# Patient Record
Sex: FEMALE | Race: WHITE | Employment: FULL TIME | URBAN - METROPOLITAN AREA
[De-identification: names, ages, dates, MRNs, and addresses within clinical notes are randomized per-mention and may not be internally consistent; named-entity substitution may affect disease eponyms.]

---

## 2019-08-26 ENCOUNTER — HOSPITAL ENCOUNTER (OUTPATIENT)
Dept: GENERAL RADIOLOGY | Age: 59
Discharge: HOME | End: 2019-08-26
Payer: COMMERCIAL

## 2019-08-26 DIAGNOSIS — R52 PAIN: ICD-10-CM

## 2019-08-26 PROCEDURE — 74018 RADEX ABDOMEN 1 VIEW: CPT

## 2021-01-11 ENCOUNTER — OFFICE VISIT (OUTPATIENT)
Dept: NEUROLOGY | Age: 61
End: 2021-01-11
Payer: COMMERCIAL

## 2021-01-11 VITALS
WEIGHT: 208 LBS | SYSTOLIC BLOOD PRESSURE: 138 MMHG | BODY MASS INDEX: 34.66 KG/M2 | OXYGEN SATURATION: 97 % | HEART RATE: 88 BPM | HEIGHT: 65 IN | DIASTOLIC BLOOD PRESSURE: 84 MMHG

## 2021-01-11 DIAGNOSIS — R25.3 FASCICULATIONS: ICD-10-CM

## 2021-01-11 DIAGNOSIS — R53.1 WEAKNESS GENERALIZED: Primary | ICD-10-CM

## 2021-01-11 DIAGNOSIS — R13.19 OTHER DYSPHAGIA: ICD-10-CM

## 2021-01-11 PROCEDURE — 99244 OFF/OP CNSLTJ NEW/EST MOD 40: CPT | Performed by: PSYCHIATRY & NEUROLOGY

## 2021-01-11 RX ORDER — LORAZEPAM 1 MG/1
TABLET ORAL
Qty: 4 TAB | Refills: 0 | Status: SHIPPED | OUTPATIENT
Start: 2021-01-11

## 2021-01-11 NOTE — LETTER
1/11/2021 Patient: Latesha So YOB: 1960 Date of Visit: 1/11/2021 Leni Serrano MD 
[address] [place] [address] [address] [id number] Via Fax: [phone] Dear Leni Serrano MD, Thank you for referring Ms. Jean Matson to [address] [place] [address] for evaluation. My notes for this consultation are attached. If you have questions, please do not hesitate to call me. I look forward to following your patient along with you. Sincerely, [address] [facility], DO 
 
1/11/2021 Patient:  Latesha So YOB: 1960 Date of Visit: 1/11/2021 Dear Leni Serrano MD 
[address] [place] [address] 805 1890 3827 [facility] 7 [phone] Via Fax: [phone]: I was requested by Pricilla Jimenez MD to evaluate Ms. Jean Matson  for Chief Complaint Patient presents with  Neurologic Problem Muscle twitching \"that started in both my legs over a year ago and since then has moved up to my arms and lower back and it seems to come with alot of overall weakness. \" Roosevelt Kim I am recommending the following:  
 
Diagnoses and all orders for this visit: 
 
1. Weakness generalized -     ACETYLCHOLINE RECEPTOR PANEL; Future -     MRI BRAIN WO CONT; Future -     MRI CERV SPINE WO CONT; Future -     LORazepam (ATIVAN) 1 mg tablet; 1 tab 30-40 min before MRI, may repeat once, no driving. 
-     EMG NCV MOTOR WITH F/WAVE PER NERVE; Future 2. Fasciculations -     ACETYLCHOLINE RECEPTOR PANEL; Future -     MRI BRAIN WO CONT; Future -     MRI CERV SPINE WO CONT; Future -     LORazepam (ATIVAN) 1 mg tablet; 1 tab 30-40 min before MRI, may repeat once, no driving. 
-     EMG NCV MOTOR WITH F/WAVE PER NERVE; Future 3. Other dysphagia -     ACETYLCHOLINE RECEPTOR PANEL; Future -     MRI BRAIN WO CONT; Future -     MRI CERV SPINE WO CONT; Future -     LORazepam (ATIVAN) 1 mg tablet; 1 tab 30-40 min before MRI, may repeat once, no driving. 
-     EMG NCV MOTOR WITH F/WAVE PER NERVE; Future 
 
 
 
---------------------------------------------------------------------------------------------------------------------- Below is my encounter: Chief Complaint Patient presents with  Neurologic Problem Muscle twitching \"that started in both my legs over a year ago and since then has moved up to my arms and lower back and it seems to come with alot of overall weakness. \"  
 
 
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 She tells me for about a year maybe longer she is noticed some twitching that began in the left thigh and now has become more generalized in both thighs sometimes her arms. No facial or torso involvement. She feels like she has some weakness by the end of the day where it is hard to ambulate. No double vision. No slurred speech. She does think in the morning she is a little bit of dysphagia but that is only temporary and she thinks is due to reflux and recent stricture that has been stretched in the past.  No falls. No unusual headaches. Family history remarkable for Parkinson's and MS. 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  Increasing anxiety. Review of Systems Constitutional: Positive for malaise/fatigue. Eyes: Negative for double vision. Respiratory: Negative for shortness of breath. Neurological: Positive for weakness. Twitching Psychiatric/Behavioral: The patient is nervous/anxious. All other systems reviewed and are negative. Past Medical History:  
Diagnosis Date  Arrhythmia   
 hx of svt  Asthma   
 as child  GERD (gastroesophageal reflux disease)  Hypertension  Other ill-defined conditions(799.89) seasonal allergies  Other ill-defined conditions(799.89)   
 dyslipidemia Family History Problem Relation Age of Onset  Arthritis-rheumatoid Mother  Elevated Lipids Mother  Heart Disease Father  Parkinsonism Father  Hypertension Father  Heart Failure Father  MS Sister Social History Socioeconomic History  Marital status:  Spouse name: Not on file  Number of children: Not on file  Years of education: Not on file  Highest education level: Not on file Occupational History  Not on file Social Needs  Financial resource strain: Not on file  Food insecurity Worry: Not on file Inability: Not on file  Transportation needs Medical: Not on file Non-medical: Not on file Tobacco Use  Smoking status: Never Smoker  Smokeless tobacco: Never Used Substance and Sexual Activity  Alcohol use: Yes Alcohol/week: 0.0 standard drinks Comment: occ. etoh  Drug use: Yes Types: OTC  Sexual activity: Not on file Lifestyle  Physical activity Days per week: Not on file Minutes per session: Not on file  Stress: Not on file Relationships  Social connections Talks on phone: Not on file Gets together: Not on file Attends Mandaeism service: Not on file Active member of club or organization: Not on file Attends meetings of clubs or organizations: Not on file Relationship status: Not on file  Intimate partner violence Fear of current or ex partner: Not on file Emotionally abused: Not on file Physically abused: Not on file Forced sexual activity: Not on file Other Topics Concern  Not on file Social History Narrative  Not on file Current Outpatient Medications Medication Sig  LORazepam (ATIVAN) 1 mg tablet 1 tab 30-40 min before MRI, may repeat once, no driving.  losartan-hydrochlorothiazide (HYZAAR) 100-25 mg per tablet Take 1 Tab by mouth daily.  albuterol (PROVENTIL HFA) 90 mcg/actuation inhaler Take  by inhalation.  hydrocortisone (ANUSOL-HC) 25 mg supp Insert 1 Suppository into rectum every twelve (12) hours as needed. Indications: HEMORRHOIDS  witch hazel-glycerin (TUCKS, WITCH HAZEL,) 50 % padm Use 1-2 wipes for cleansing after bowel movements and as needed for hemorrhoids Indications: HEMORRHOIDS  cetirizine (ZYRTEC) 10 mg tablet Take 10 mg by mouth as needed. No current facility-administered medications for this visit. Allergies Allergen Reactions  Shellfish Containing Products Shortness of Breath and Nausea and Vomiting Neurologic Exam  
 
Mental Status Oriented to person, place, and time. Cranial Nerves Cranial nerves II through XII intact. Motor Exam  
Muscle bulk: normal 
 
Strength Strength 5/5 throughout. No fasciculations Sensory Exam  
Light touch normal.  
 
Gait, Coordination, and Reflexes Gait Gait: normal 
 
Coordination Finger to nose coordination: normal 
 
Tremor Resting tremor: absentSymmetric brisk reflexes throughout 3/4 Normal tone No abnormal movements Physical Exam  
Constitutional: She is oriented to person, place, and time. She appears well-developed and well-nourished. Cardiovascular: Normal rate. Pulmonary/Chest: Effort normal.  
Neurological: She is oriented to person, place, and time. She has normal strength. She has a normal Finger-Nose-Finger Test. Gait normal.  
Skin: Skin is warm and dry. Psychiatric: Her behavior is normal.  
Vitals reviewed. Visit Vitals /84 (BP 1 Location: Left arm, BP Patient Position: Sitting) Pulse 88 Ht 5' 5\" (1.651 m) Wt 208 lb (94.3 kg) SpO2 97% BMI 34.61 kg/m² No blood work to review. CT Results (maximum last 3): Results from [facility] Encounter encounter on 12/17/10 CT PELVIS WITH CONTRAST Narrative Final Report ICD Codes / Adm. Diagnosis: 789.03   / ACUTE RIGHT LOWER QUADRANT HILDA Examination:  Gloriakarinaanastasia Buchanan  - [id number] - Dec 17 2010  1:12PM 
 Accession No:  [id number] Reason:  abd pain REPORT: 
INDICATION: Right lower quadrant pain. COMPARISON: None. TECHNIQUE:  
Multislice helical CT was performed from the diaphragm to the symphysis  
pubis during uneventful rapid bolus intravenous administration of 100 mL of  
Optiray 350. Oral contrast was also administered. Delayed images of the  
abdomen were acquired. Contiguous 5 mm axial images were reconstructed and  
lung and soft tissue windows were generated. Coronal reformations were  
generated. FINDINGS: 
The visualized portions of the lung bases are clear. There are no focal abnormalities within the liver, spleen, pancreas, adrenal  
glands or kidneys. Right upper quadrant clips are present. The aorta tapers without aneurysm. There is no retroperitoneal adenopathy  
or mass. The bowel is normal.  The appendix is not visualized. . There is no ascites  
or free intraperitoneal air. The uterus has been resected   There is no pelvic mass or adenopathy. The delayed images demonstrate good bilateral excretion of contrast into the  
renal collecting systems. Multiple sclerotic 3 to 4 mm foci are present in the L1 vertebra and pelvis,  
suggesting osteopoikilosis. IMPRESSION:  
 
No acute abdominal or pelvic process seen Osteopoikilosis Interpreting/Reading Doctor: Jack Barney ([id number]) Transcribed:  on 12/17/2010 María Gerardo ([id number])  12/17/2010 Distribution:  Attending Doctor: Vicky Marin 
  
 
   
CT ABDOMEN WITH CONTRAST Narrative Final Report ICD Codes / Adm. Diagnosis: 789.03   / ACUTE RIGHT LOWER QUADRANT HILDA [facility]  [facility]  - [id number] - Dec 17 2010  1:12PM 
Accession No:  [id number] Reason:  right lower quad pain REPORT: 
INDICATION: Right lower quadrant pain. COMPARISON: None. TECHNIQUE:  
Multislice helical CT was performed from the diaphragm to the symphysis pubis during uneventful rapid bolus intravenous administration of 100 mL of  
Optiray 350. Oral contrast was also administered. Delayed images of the  
abdomen were acquired. Contiguous 5 mm axial images were reconstructed and  
lung and soft tissue windows were generated. Coronal reformations were  
generated. FINDINGS: 
The visualized portions of the lung bases are clear. There are no focal abnormalities within the liver, spleen, pancreas, adrenal  
glands or kidneys. Right upper quadrant clips are present. The aorta tapers without aneurysm. There is no retroperitoneal adenopathy  
or mass. The bowel is normal.  The appendix is not visualized. . There is no ascites  
or free intraperitoneal air. The uterus has been resected   There is no pelvic mass or adenopathy. The delayed images demonstrate good bilateral excretion of contrast into the  
renal collecting systems. Multiple sclerotic 3 to 4 mm foci are present in the L1 vertebra and pelvis,  
suggesting osteopoikilosis. IMPRESSION:  
 
No acute abdominal or pelvic process seen Osteopoikilosis Interpreting/Reading Doctor: Blue Jacobs ([id number]) Transcribed:  on 12/17/2010 Eduardo Ahmadi ([id number])  12/17/2010 Distribution:  Attending Doctor: Rod Pina MRI Results (maximum last 3): Results from Hospital Encounter encounter on 11/06/09 MRI ABDOMEN WITH AND WITHOUT CONT Narrative Final Report ICD Codes / Adm. Diagnosis:    /   Urban Nando Examination:  ABDOMEN W AND WO CON  - [id number] - Nov 6 2009  8:29AM 
Accession No:  [id number] Reason:  Urban Nando REPORT: 
INDICATION: See above COMPARISON: Report from abdominal ultrasound on 08/27/2009 TECHNIQUE: MR imaging of the abdomen was performed and the following  
sequences were obtained: multiplanar T2, axial T1 with  
in-phase/out-of-phase.   Pre and post contrast imaging was performed using 20  
 mL of Magnevist. 
 
 FINDINGS: 
 
There is 1.6 x 1.2 cm lobulated lesion in the anterior segment of the right  
hepatic lobe. The lesion is homogeneously hyperintense on T2 and hypointense  
on T1. Following contrast administration, arterial phase imaging  
demonstrates peripheral discontinuous enhancement lesion. On more delayed  
imaging, there is increasing central enhancement lesion. No additional  
hepatic lesions are identified. The gallbladder is normal with no filling  
defects. The spleen is normal.  The pancreas is normal.  The adrenal glands  
are normal.  The kidneys are normal.  There is no lymphadenopathy or ascites. IMPRESSION: 
 
1.6 x 1.2 cm lesion in the anterior segment of the right hepatic lobe,  
demonstrating imaging characteristics consistent with a hemangioma. Interpreting/Reading Doctor: Celeste Pierre ([id number]) Transcribed: n/a on 11/06/2009 Approved: Celeste Pierre ([id number])  11/06/2009 Distribution:  Attending Doctor: Sanchez Villa Alternate Doctor: Sanchez Villa 
 
   
 
 
PET Results (maximum last 3): No results found for this or any previous visit. Assessment and Plan Diagnoses and all orders for this visit: 
 
1. Weakness generalized -     ACETYLCHOLINE RECEPTOR PANEL; Future -     MRI BRAIN WO CONT; Future -     MRI CERV SPINE WO CONT; Future -     LORazepam (ATIVAN) 1 mg tablet; 1 tab 30-40 min before MRI, may repeat once, no driving. 
-     EMG NCV MOTOR WITH F/WAVE PER NERVE; Future 2. Fasciculations -     ACETYLCHOLINE RECEPTOR PANEL; Future -     MRI BRAIN WO CONT; Future -     MRI CERV SPINE WO CONT; Future -     LORazepam (ATIVAN) 1 mg tablet; 1 tab 30-40 min before MRI, may repeat once, no driving. 
-     EMG NCV MOTOR WITH F/WAVE PER NERVE; Future 3. Other dysphagia -     ACETYLCHOLINE RECEPTOR PANEL; Future -     MRI BRAIN WO CONT; Future -     MRI CERV SPINE WO CONT; Future -     LORazepam (ATIVAN) 1 mg tablet; 1 tab 30-40 min before MRI, may repeat once, no driving. 
-     EMG NCV MOTOR WITH F/WAVE PER NERVE; Future 61-year-old woman who presents with what seems to be insidious fasciculations that began in the left lower extremity now in all extremities. I could not appreciate any fasciculations on exam.  No myotonia. No weakness. It is concerning she is having the symptoms that particularly feel worse by the end of the day suggesting a fluctuating nature. Going to check an acetylcholine panel looking for evidence to support myasthenia. Check EMG evaluate for motor neuron disease. MRI ordered of the brain and C-spine as well to rule out other possibilities given the family history and symptoms. Ativan to premedicate for the imaging. We discussed all the possibilities and differential in great detail. No restrictions on her activity at this time. I would like some time to pass and reevaluate her in 3 months however if studies show anything acutely abnormal we will be seeing her sooner. Questions answered in detail. I reviewed and decided to continue the current medications. This clinical note was dictated with an electronic dictation software that can make unintentional errors. If there are any questions, please contact me directly for clarification. A notice of this visit/encounter being completed has been sent electronically to the patient's PCP and/or referring provider. Thank you for giving me the opportunity to assist in the care of Ms. Gordo Garcia. If you have questions, please do not hesitate to contact me. Sincerely, 812 [facility], DO Neurologist 
Brain Injury Medicine Diplomate RAJINDER

## 2021-01-11 NOTE — PROGRESS NOTES
Chief Complaint   Patient presents with    Neurologic Problem     Muscle twitching \"that started in both my legs over a year ago and since then has moved up to my arms and lower back and it seems to come with alot of overall weakness. 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 She tells me for about a year maybe longer she is noticed some twitching that began in the left thigh and now has become more generalized in both thighs sometimes her arms. No facial or torso involvement. She feels like she has some weakness by the end of the day where it is hard to ambulate. No double vision. No slurred speech. She does think in the morning she is a little bit of dysphagia but that is only temporary and she thinks is due to reflux and recent stricture that has been stretched in the past.  No falls. No unusual headaches. Family history remarkable for Parkinson's and MS. 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  Increasing anxiety. Review of Systems   Constitutional: Positive for malaise/fatigue. Eyes: Negative for double vision. Respiratory: Negative for shortness of breath. Neurological: Positive for weakness. Twitching   Psychiatric/Behavioral: The patient is nervous/anxious. All other systems reviewed and are negative.       Past Medical History:   Diagnosis Date    Arrhythmia     hx of svt    Asthma     as child    GERD (gastroesophageal reflux disease)     Hypertension     Other ill-defined conditions(799.89)     seasonal allergies    Other ill-defined conditions(799.89)     dyslipidemia     Family History   Problem Relation Age of Onset   Angle Her Arthritis-rheumatoid Mother     Elevated Lipids Mother     Heart Disease Father     Parkinsonism Father     Hypertension Father     Heart Failure Father     MS Sister      Social History     Socioeconomic History    Marital status:      Spouse name: Not on file    Number of children: Not on file    Years of education: Not on file    Highest education level: Not on file   Occupational History    Not on file   Social Needs    Financial resource strain: Not on file    Food insecurity     Worry: Not on file     Inability: Not on file    Transportation needs     Medical: Not on file     Non-medical: Not on file   Tobacco Use    Smoking status: Never Smoker    Smokeless tobacco: Never Used   Substance and Sexual Activity    Alcohol use: Yes     Alcohol/week: 0.0 standard drinks     Comment: occ. etoh    Drug use: Yes     Types: OTC    Sexual activity: Not on file   Lifestyle    Physical activity     Days per week: Not on file     Minutes per session: Not on file    Stress: Not on file   Relationships    Social connections     Talks on phone: Not on file     Gets together: Not on file     Attends Cheondoism service: Not on file     Active member of club or organization: Not on file     Attends meetings of clubs or organizations: Not on file     Relationship status: Not on file    Intimate partner violence     Fear of current or ex partner: Not on file     Emotionally abused: Not on file     Physically abused: Not on file     Forced sexual activity: Not on file   Other Topics Concern    Not on file   Social History Narrative    Not on file     Current Outpatient Medications   Medication Sig    LORazepam (ATIVAN) 1 mg tablet 1 tab 30-40 min before MRI, may repeat once, no driving.  losartan-hydrochlorothiazide (HYZAAR) 100-25 mg per tablet Take 1 Tab by mouth daily.  albuterol (PROVENTIL HFA) 90 mcg/actuation inhaler Take  by inhalation.  hydrocortisone (ANUSOL-HC) 25 mg supp Insert 1 Suppository into rectum every twelve (12) hours as needed.  Indications: HEMORRHOIDS    witch hazel-glycerin (TUCKS, WITCH HAZEL,) 50 % padm Use 1-2 wipes for cleansing after bowel movements and as needed for hemorrhoids    Indications: HEMORRHOIDS    cetirizine (ZYRTEC) 10 mg tablet Take 10 mg by mouth as needed. No current facility-administered medications for this visit. Allergies   Allergen Reactions    Shellfish Containing Products Shortness of Breath and Nausea and Vomiting         Neurologic Exam     Mental Status   Oriented to person, place, and time. Cranial Nerves   Cranial nerves II through XII intact. Motor Exam   Muscle bulk: normal    Strength   Strength 5/5 throughout. No fasciculations     Sensory Exam   Light touch normal.     Gait, Coordination, and Reflexes     Gait  Gait: normal    Coordination   Finger to nose coordination: normal    Tremor   Resting tremor: absentSymmetric brisk reflexes throughout 3/4  Normal tone  No abnormal movements     Physical Exam   Constitutional: She is oriented to person, place, and time. She appears well-developed and well-nourished. Cardiovascular: Normal rate. Pulmonary/Chest: Effort normal.   Neurological: She is oriented to person, place, and time. She has normal strength. She has a normal Finger-Nose-Finger Test. Gait normal.   Skin: Skin is warm and dry. Psychiatric: Her behavior is normal.   Vitals reviewed. Visit Vitals  /84 (BP 1 Location: Left arm, BP Patient Position: Sitting)   Pulse 88   Ht 5' 5\" (1.651 m)   Wt 208 lb (94.3 kg)   SpO2 97%   BMI 34.61 kg/m²     No blood work to review. CT Results (maximum last 3): Results from [place] encounter on 12/17/10   CT PELVIS WITH CONTRAST    Narrative Final Report       ICD Codes / Adm. Diagnosis: 789.03   / ACUTE RIGHT LOWER QUADRANT HILDA    Examination:  PELVIS W CON  - [id number] - Dec 17 2010  1:12PM  Accession No:  [id number]  Reason:  abd pain      REPORT:  INDICATION: Right lower quadrant pain. COMPARISON: None. TECHNIQUE:   Multislice helical CT was performed from the diaphragm to the symphysis   pubis during uneventful rapid bolus intravenous administration of 100 mL of   Optiray 350.  Oral contrast was also administered. Delayed images of the   abdomen were acquired. Contiguous 5 mm axial images were reconstructed and   lung and soft tissue windows were generated. Coronal reformations were   generated. FINDINGS:  The visualized portions of the lung bases are clear. There are no focal abnormalities within the liver, spleen, pancreas, adrenal   glands or kidneys. Right upper quadrant clips are present. The aorta tapers without aneurysm. There is no retroperitoneal adenopathy   or mass. The bowel is normal.  The appendix is not visualized. . There is no ascites   or free intraperitoneal air. The uterus has been resected   There is no pelvic mass or adenopathy. The delayed images demonstrate good bilateral excretion of contrast into the   renal collecting systems. Multiple sclerotic 3 to 4 mm foci are present in the L1 vertebra and pelvis,   suggesting osteopoikilosis. IMPRESSION:     No acute abdominal or pelvic process seen  Osteopoikilosis            Interpreting/Reading Doctor: Kar Locke ([id number])  Transcribed:  on 12/17/2010  Approved: Kar Locke ([id number])  12/17/2010             Distribution:  Attending Doctor: Ming Davenport           CT ABDOMEN WITH CONTRAST    Narrative Final Report       ICD Codes / Adm. Diagnosis: 789.03   / ACUTE RIGHT LOWER QUADRANT HILDA    Examination:  Stacie Oreilly  - [id number] - Dec 17 2010  1:12PM  Accession No:  [id number]  Reason:  right lower quad pain      REPORT:  INDICATION: Right lower quadrant pain. COMPARISON: None. TECHNIQUE:   Multislice helical CT was performed from the diaphragm to the symphysis   pubis during uneventful rapid bolus intravenous administration of 100 mL of   Optiray 350. Oral contrast was also administered. Delayed images of the   abdomen were acquired. Contiguous 5 mm axial images were reconstructed and   lung and soft tissue windows were generated. Coronal reformations were   generated.      FINDINGS:  The visualized portions of the lung bases are clear. There are no focal abnormalities within the liver, spleen, pancreas, adrenal   glands or kidneys. Right upper quadrant clips are present. The aorta tapers without aneurysm. There is no retroperitoneal adenopathy   or mass. The bowel is normal.  The appendix is not visualized. . There is no ascites   or free intraperitoneal air. The uterus has been resected   There is no pelvic mass or adenopathy. The delayed images demonstrate good bilateral excretion of contrast into the   renal collecting systems. Multiple sclerotic 3 to 4 mm foci are present in the L1 vertebra and pelvis,   suggesting osteopoikilosis. IMPRESSION:     No acute abdominal or pelvic process seen  Osteopoikilosis            Interpreting/Reading Doctor: Keara Sandoval ([id number])  Transcribed:  on 12/17/2010  Approved: Keara Sandoval ([id number])  12/17/2010             Distribution:  Attending Doctor: Marylee Blamer               MRI Results (maximum last 3): Results from [place] encounter on 11/06/09   MRI ABDOMEN WITH AND WITHOUT CONT    Narrative Final Report       ICD Codes / Adm. Diagnosis:    /   Serafin Mendes  Examination:  ABDOMEN W AND WO CON  - [id number] - Nov 6 2009  8:29AM  Accession No:  [id number]  Reason:  HEMAGIOMA      REPORT:  INDICATION: See above    COMPARISON: Report from abdominal ultrasound on 08/27/2009    TECHNIQUE: MR imaging of the abdomen was performed and the following   sequences were obtained: multiplanar T2, axial T1 with   in-phase/out-of-phase. Pre and post contrast imaging was performed using 20    mL of Magnevist.    FINDINGS:    There is 1.6 x 1.2 cm lobulated lesion in the anterior segment of the right   hepatic lobe. The lesion is homogeneously hyperintense on T2 and hypointense   on T1. Following contrast administration, arterial phase imaging   demonstrates peripheral discontinuous enhancement lesion.  On more delayed   imaging, there is increasing central enhancement lesion. No additional   hepatic lesions are identified.  The gallbladder is normal with no filling   defects.  The spleen is normal.  The pancreas is normal.  The adrenal glands   are normal.  The kidneys are normal.  There is no lymphadenopathy or ascites.        IMPRESSION:    1.6 x 1.2 cm lesion in the anterior segment of the right hepatic lobe,   demonstrating imaging characteristics consistent with a hemangioma.            Interpreting/Reading Doctor: JASON CASTELLANOS ([id number])  Transcribed: n/a on 11/06/2009  Approved: JASON CASTELLANOS ([id number])  11/06/2009             Distribution:  Attending Doctor: KAROL LAUREANO   Alternate Doctor: KAROL LAUREANO            PET Results (maximum last 3):  No results found for this or any previous visit.      Assessment and Plan   Diagnoses and all orders for this visit:    1. Weakness generalized  -     ACETYLCHOLINE RECEPTOR PANEL; Future  -     MRI BRAIN WO CONT; Future  -     MRI CERV SPINE WO CONT; Future  -     LORazepam (ATIVAN) 1 mg tablet; 1 tab 30-40 min before MRI, may repeat once, no driving.  -     EMG NCV MOTOR WITH F/WAVE PER NERVE; Future    2. Fasciculations  -     ACETYLCHOLINE RECEPTOR PANEL; Future  -     MRI BRAIN WO CONT; Future  -     MRI CERV SPINE WO CONT; Future  -     LORazepam (ATIVAN) 1 mg tablet; 1 tab 30-40 min before MRI, may repeat once, no driving.  -     EMG NCV MOTOR WITH F/WAVE PER NERVE; Future    3. Other dysphagia  -     ACETYLCHOLINE RECEPTOR PANEL; Future  -     MRI BRAIN WO CONT; Future  -     MRI CERV SPINE WO CONT; Future  -     LORazepam (ATIVAN) 1 mg tablet; 1 tab 30-40 min before MRI, may repeat once, no driving.  -     EMG NCV MOTOR WITH F/WAVE PER NERVE; Future      60-year-old woman who presents with what seems to be insidious fasciculations that began in the left lower extremity now in all extremities.  I could not appreciate any fasciculations on exam.  No myotonia.  No weakness.  It is concerning she is having the  symptoms that particularly feel worse by the end of the day suggesting a fluctuating nature. Going to check an acetylcholine panel looking for evidence to support myasthenia. Check EMG evaluate for motor neuron disease. MRI ordered of the brain and C-spine as well to rule out other possibilities given the family history and symptoms. Ativan to premedicate for the imaging. We discussed all the possibilities and differential in great detail. No restrictions on her activity at this time. I would like some time to pass and reevaluate her in 3 months however if studies show anything acutely abnormal we will be seeing her sooner. Questions answered in detail. I reviewed and decided to continue the current medications. This clinical note was dictated with an electronic dictation software that can make unintentional errors. If there are any questions, please contact me directly for clarification. A notice of this visit/encounter being completed has been sent electronically to the patient's PCP and/or referring provider.      2 [facility], [facility] [address]  Diplomate RAJINDER

## 2021-01-11 NOTE — PROGRESS NOTES
Chief Complaint   Patient presents with    Neurologic Problem     Muscle twitching \"that started in both my legs over a year ago and since then has moved up to my arms and lower back and it seems to come with alot of overall weakness. \"     Visit Vitals  /84 (BP 1 Location: Left arm, BP Patient Position: Sitting)   Pulse 88   Ht 5' 5\" (1.651 m)   Wt 208 lb (94.3 kg)   SpO2 97%   BMI 34.61 kg/m²

## 2021-01-12 ENCOUNTER — TELEPHONE (OUTPATIENT)
Dept: NEUROLOGY | Age: 61
End: 2021-01-12

## 2021-01-25 LAB
ACHR BIND AB SER-SCNC: 0.05 NMOL/L (ref 0–0.24)
ACHR BLOCK AB SER-ACNC: 15 % (ref 0–25)
ACHR MOD AB/ACHR TOTAL SFR SER: <12 % (ref 0–20)

## 2021-02-09 ENCOUNTER — TELEPHONE (OUTPATIENT)
Dept: NEUROLOGY | Age: 61
End: 2021-02-09

## 2021-02-26 ENCOUNTER — OFFICE VISIT (OUTPATIENT)
Dept: NEUROLOGY | Age: 61
End: 2021-02-26
Payer: COMMERCIAL

## 2021-02-26 VITALS
RESPIRATION RATE: 16 BRPM | SYSTOLIC BLOOD PRESSURE: 130 MMHG | HEIGHT: 65 IN | BODY MASS INDEX: 34.66 KG/M2 | HEART RATE: 78 BPM | DIASTOLIC BLOOD PRESSURE: 80 MMHG | WEIGHT: 208 LBS | OXYGEN SATURATION: 98 %

## 2021-02-26 DIAGNOSIS — R25.3 FASCICULATIONS: Primary | ICD-10-CM

## 2021-02-26 DIAGNOSIS — R53.1 WEAKNESS GENERALIZED: ICD-10-CM

## 2021-02-26 PROCEDURE — 95911 NRV CNDJ TEST 9-10 STUDIES: CPT | Performed by: PSYCHIATRY & NEUROLOGY

## 2021-02-26 PROCEDURE — 95886 MUSC TEST DONE W/N TEST COMP: CPT | Performed by: PSYCHIATRY & NEUROLOGY

## 2021-02-26 NOTE — PROGRESS NOTES
[facility]  [facility]  [address] [place] [address], [address]  [place] [address]  Phone [phone] Fax [phone]  Test Date:  2021    Patient: Mary Castro : 1960 Physician: Tricia Rachel DO   Sex: Female Height: 5' 5\" Ref Phys: Katherine Rosenberg, DO   ID#: [id number] Weight: 208 lbs. Technician: Darnell Lopez     Patient Complaints:  Fasciculations; Lower extremity weakness; MND eval    NCV & EMG Findings:  All nerve conduction studies were within normal limits.      All F Wave latencies were within normal limits.      All examined muscles (as indicated in the following table) showed no evidence of electrical instability.      Impression:  Extensive electrodiagnostic examination of the left lower and upper extremities shows no abnormalities. In particular, there is no evidence of fasciculation potentials or active on chronic motor axon loss. Findings do not support an evolving anterior horn cell process (MND) as characterized by [place] criteria.  In addition, there is no evidence of a left cervical or lumbosacral motor radiculopathy or generalized polyneuropathy.      ___________________________  Tricia Rachel,         Nerve Conduction Studies  Anti Sensory Summary Table     Stim Site NR Peak (ms) Norm Peak (ms) P-T Amp (µV) Norm P-T Amp Onset (ms) Site1 Site2 Delta-P (ms) Dist (cm) Floyd (m/s) Norm Floyd (m/s)   Left Median Anti Sensory (2nd Digit)  30°C   Wrist    3.2 <3.6 29.4 >10 2.5 Wrist 2nd Digit 3.2 14.0 44 >39   Left Radial Anti Sensory (Base 1st Digit)  30°C   Wrist    2.1 <3.1 41.9  1.6 Wrist Base 1st Digit 2.1 0.0     Left Sup Peroneal Anti Sensory (Ant Lat Mall)  30°C   14 cm    2.8 <4.4 12.3 >5.0 2.2 14 cm Ant Lat Mall 2.8 14.0 50 >32   Site 2    2.7  7.5  2.3         Left Sural Anti Sensory (Lat Mall)  30°C   Calf    3.7 <4.0 8.3 >5.0 2.9 Calf Lat Mall 3.7 14.0 38 >35   Site 2    3.8  8.0  2.9         Left Ulnar Anti  Sensory (5th Digit)  30°C   Wrist    2.9 <3.7 18.9 >15.0 2.4 Wrist 5th Digit 2.9 14.0 48 >38     Motor Summary Table     Stim Site NR Onset (ms) Norm Onset (ms) O-P Amp (mV) Norm O-P Amp Site1 Site2 Delta-0 (ms) Dist (cm) Floyd (m/s) Norm Floyd (m/s)   Left Median Motor (Abd Poll Brev)  30°C   Wrist    3.3 <4.2 5.1 >5 Elbow Wrist 3.6 20.0 56 >50   Elbow    6.9  4.2          Left Peroneal Motor (Ext Dig Brev)  30°C   Ankle    3.9 <6.1 3.5 >2.5 B Fib Ankle 7.0 30.0 43 >38   B Fib    10.9  3.1  Poplt B Fib 1.8 10.0 56 >40   Poplt    12.7  2.7          Left Tibial Motor (Abd Moreno Brev)  30°C   Ankle    3.4 <6.1 8.8 >3.0 Knee Ankle 8.4 40.0 48 >35   Knee    11.8  6.4          Left Ulnar Motor (Abd Dig Minimi)  30°C   Wrist    2.7 <4.2 8.1 >3 B Elbow Wrist 3.1 18.0 58 >53   B Elbow    5.8  7.1  A Elbow B Elbow 1.5 10.0 67 >53   A Elbow    7.3  6.3            F Wave Studies     NR F-Lat (ms) Lat Norm (ms) L-R F-Lat (ms) L-R Lat Norm   Left Tibial (Mrkrs) (Abd Hallucis)  30°C      49.78 <61  <5.7   Left Ulnar (Mrkrs) (Abd Dig Min)  30°C      26.65 <36  <2.5     EMG     Side Muscle Nerve Root Ins Act Fibs Psw Amp Dur Poly Recrt Int Pat Comment   Left Ext Dig Brev Dp Br Peronel L5, S1 Nml Nml Nml Nml Nml 0 Nml Nml    Left AbdHallucis MedPlantar S1-2 Nml Nml Nml Nml Nml 0 Nml Nml    Left Gastroc Tibial S1-2 Nml Nml Nml Nml Nml 0 Nml Nml    Left AntTibialis Dp Br Peronel L4-5 Nml Nml Nml Nml Nml 0 Nml Nml    Left RectFemoris Femoral L2-4 Nml Nml Nml Nml Nml 0 Nml Nml    Left 1stDorInt Ulnar C8-T1 Nml Nml Nml Nml Nml 0 Nml Nml    Left FlexPolLong Median (Ant Int) C7-8 Nml Nml Nml Nml Nml 0 Nml Nml    Left Ext Indicis Radial (Post Int) C7-8 Nml Nml Nml Nml Nml 0 Nml Nml    Left Biceps Musculocut C5-6 Nml Nml Nml Nml Nml 0 Nml Nml    Left Triceps Radial C6-7-8 Nml Nml Nml Nml Nml 0 Nml Nml    Left Deltoid Axillary C5-6 Nml Nml Nml Nml Nml 0 Nml Nml          Waveforms:

## 2021-07-30 ENCOUNTER — OFFICE VISIT (OUTPATIENT)
Dept: NEUROLOGY | Age: 61
End: 2021-07-30
Payer: COMMERCIAL

## 2021-07-30 VITALS
SYSTOLIC BLOOD PRESSURE: 128 MMHG | WEIGHT: 212 LBS | BODY MASS INDEX: 35.32 KG/M2 | HEIGHT: 65 IN | OXYGEN SATURATION: 98 % | HEART RATE: 80 BPM | DIASTOLIC BLOOD PRESSURE: 84 MMHG

## 2021-07-30 DIAGNOSIS — Z56.6 WORK-RELATED STRESS: ICD-10-CM

## 2021-07-30 DIAGNOSIS — R25.3 FASCICULATIONS: Primary | ICD-10-CM

## 2021-07-30 PROCEDURE — 99213 OFFICE O/P EST LOW 20 MIN: CPT | Performed by: PSYCHIATRY & NEUROLOGY

## 2021-07-30 RX ORDER — AMITRIPTYLINE HYDROCHLORIDE 10 MG/1
TABLET, FILM COATED ORAL
COMMUNITY

## 2021-07-30 RX ORDER — HYDROCHLOROTHIAZIDE 25 MG/1
TABLET ORAL
COMMUNITY
Start: 2021-07-07

## 2021-07-30 RX ORDER — ATORVASTATIN CALCIUM 10 MG/1
TABLET, FILM COATED ORAL
COMMUNITY
Start: 2021-05-24

## 2021-07-30 SDOH — HEALTH STABILITY - MENTAL HEALTH: OTHER PHYSICAL AND MENTAL STRAIN RELATED TO WORK: Z56.6

## 2021-07-30 NOTE — PROGRESS NOTES
Chief Complaint   Patient presents with    Follow-up     muscle twitching, \" I was unable to complete the MRI due to be claustrophobic. \"       HPI    Gisela Villegas is a 57-year-old woman following up. I saw her initially for concerns of twitching in the legs and subjective weakness. We completed a battery of testing to include acetylcholine panel which was completely normal.  EMG also very normal without evidence of motor neuron disease or neuropathy or radiculopathy. She was unable to get the neuroimaging done due to severe claustrophobia. Since I saw her last she is actually feeling slightly better. She has less intense fasciculations. She is concerned that a lot of her symptoms could be stress driven. 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 She tells me for about a year maybe longer she is noticed some twitching that began in the left thigh and now has become more generalized in both thighs sometimes her arms. No facial or torso involvement. She feels like she has some weakness by the end of the day where it is hard to ambulate. No double vision. No slurred speech. She does think in the morning she is a little bit of dysphagia but that is only temporary and she thinks is due to reflux and recent stricture that has been stretched in the past.  No falls. No unusual headaches. Family history remarkable for Parkinson's and MS. 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  Increasing anxiety. Review of Systems   Psychiatric/Behavioral:        Work stress   All other systems reviewed and are negative.       Past Medical History:   Diagnosis Date    Arrhythmia     hx of svt    Asthma     as child    GERD (gastroesophageal reflux disease)     Hypertension     Other ill-defined conditions(698.89)     seasonal allergies    Other ill-defined conditions(739.89)     dyslipidemia     Family History   Problem Relation Age of Onset    Arthritis-rheumatoid Mother     Elevated Lipids Mother     Heart Disease Father     Parkinsonism Father     Hypertension Father     Heart Failure Father     MS Sister      Social History     Socioeconomic History    Marital status:      Spouse name: Not on file    Number of children: Not on file    Years of education: Not on file    Highest education level: Not on file   Occupational History    Not on file   Tobacco Use    Smoking status: Never Smoker    Smokeless tobacco: Never Used   Vaping Use    Vaping Use: Never used   Substance and Sexual Activity    Alcohol use: Yes     Alcohol/week: 0.0 standard drinks     Comment: occ. etoh    Drug use: Yes     Types: OTC    Sexual activity: Not on file   Other Topics Concern    Not on file   Social History Narrative    Not on file     Social Determinants of Health     Financial Resource Strain:     Difficulty of Paying Living Expenses:    Food Insecurity:     Worried About Running Out of Food in the Last Year:     Ran Out of Food in the Last Year:    Transportation Needs:     Lack of Transportation (Medical):  Lack of Transportation (Non-Medical):    Physical Activity:     Days of Exercise per Week:     Minutes of Exercise per Session:    Stress:     Feeling of Stress :    Social Connections:     Frequency of Communication with Friends and Family:     Frequency of Social Gatherings with Friends and Family:     Attends Alevism Services:     Active Member of Clubs or Organizations:     Attends Club or Organization Meetings:     Marital Status:    Intimate Partner Violence:     Fear of Current or Ex-Partner:     Emotionally Abused:     Physically Abused:     Sexually Abused:       Allergies   Allergen Reactions    Shellfish Containing Products Shortness of Breath and Nausea and Vomiting         Current Outpatient Medications   Medication Sig    atorvastatin (LIPITOR) 10 mg tablet TAKE 1 TABLET BY MOUTH EVERY DAY    hydroCHLOROthiazide (HYDRODIURIL) 25 mg tablet TAKE 1 TABLET BY MOUTH EVERY DAY    amitriptyline (ELAVIL) 10 mg tablet amitriptyline 10 mg tablet    LORazepam (ATIVAN) 1 mg tablet 1 tab 30-40 min before MRI, may repeat once, no driving.  losartan-hydrochlorothiazide (HYZAAR) 100-25 mg per tablet Take 1 Tab by mouth daily.  albuterol (PROVENTIL HFA) 90 mcg/actuation inhaler Take  by inhalation.  hydrocortisone (ANUSOL-HC) 25 mg supp Insert 1 Suppository into rectum every twelve (12) hours as needed. Indications: HEMORRHOIDS    witch hazel-glycerin (TUCKS, WITCH HAZEL,) 50 % padm Use 1-2 wipes for cleansing after bowel movements and as needed for hemorrhoids    Indications: HEMORRHOIDS    cetirizine (ZYRTEC) 10 mg tablet Take 10 mg by mouth as needed. No current facility-administered medications for this visit. Neurologic Exam     Mental Status   WD/WN adult in NAD, normal grooming  VSS  A&O x 3    PERRL, nonicteric  Face is symmetric, tongue midline  Speech is fluent and clear  No limb ataxia. No abnl movements. Moving all extemities spontaneously and symmetric  Normal gait             Visit Vitals  /84 (BP 1 Location: Left upper arm, BP Patient Position: Sitting)   Pulse 80   Ht 5' 5\" (1.651 m)   Wt 212 lb (96.2 kg)   SpO2 98%   BMI 35.28 kg/m²       Assessment and Plan   Diagnoses and all orders for this visit:    1. Fasciculations    2. Work-related stress      69-year-old woman who presented to me with concerns of fasciculations and subjective weakness. Fortunately our work-up thus far does not reveal evidence of neuromuscular disease or motor neuron disease which is all very reassuring. Because her symptoms are slightly improved I think we can defer imaging at this time. She does report a lot of back pain which could potentially cause some symptoms in the legs. Will defer to primary care whom she is seeing soon for her annual check. We talked about the testing at length. At this point I would follow clinically. No additional test at the moment. Work on stress management as much as possible. No medications to start today. During this visit I personally reviewed the EMG and the specialized blood work. Follow-up as needed. I reviewed and decided to continue the current medications. This clinical note was dictated with an electronic dictation software that can make unintentional errors. If there are any questions, please contact me directly for clarification.       2 [facility], [facility][note 3] [address]  Diplomate ABPN

## 2021-07-30 NOTE — PROGRESS NOTES
Chief Complaint   Patient presents with    Follow-up     muscle twitching, \" I was unable to complete the MRI due to be claustrophobic. \"     Visit Vitals  /84 (BP 1 Location: Left upper arm, BP Patient Position: Sitting)   Pulse 80   Ht 5' 5\" (1.651 m)   Wt 212 lb (96.2 kg)   SpO2 98%   BMI 35.28 kg/m²

## 2021-09-22 ENCOUNTER — TRANSCRIBE ORDER (OUTPATIENT)
Dept: REGISTRATION | Age: 61
End: 2021-09-22

## 2021-09-22 ENCOUNTER — HOSPITAL ENCOUNTER (OUTPATIENT)
Dept: GENERAL RADIOLOGY | Age: 61
Discharge: HOME | End: 2021-09-22
Payer: COMMERCIAL

## 2021-09-22 DIAGNOSIS — M54.6 PAIN IN THORACIC SPINE: ICD-10-CM

## 2021-09-22 DIAGNOSIS — M54.50 LUMBAGO: ICD-10-CM

## 2021-09-22 DIAGNOSIS — M54.6 PAIN IN THORACIC SPINE: Primary | ICD-10-CM

## 2021-09-22 PROCEDURE — 72110 X-RAY EXAM L-2 SPINE 4/>VWS: CPT

## 2021-09-22 PROCEDURE — 72072 X-RAY EXAM THORAC SPINE 3VWS: CPT

## 2022-08-08 ENCOUNTER — TRANSCRIBE ORDER (OUTPATIENT)
Dept: SCHEDULING | Age: 62
End: 2022-08-08

## 2022-08-08 DIAGNOSIS — M25.561 RIGHT KNEE PAIN: Primary | ICD-10-CM

## 2022-08-12 ENCOUNTER — HOSPITAL ENCOUNTER (OUTPATIENT)
Dept: MRI IMAGING | Age: 62
Discharge: HOME | End: 2022-08-12
Attending: ORTHOPAEDIC SURGERY
Payer: COMMERCIAL

## 2022-08-12 DIAGNOSIS — M25.561 RIGHT KNEE PAIN: ICD-10-CM

## 2022-08-12 PROCEDURE — 73721 MRI JNT OF LWR EXTRE W/O DYE: CPT
